# Patient Record
Sex: MALE | Race: WHITE | NOT HISPANIC OR LATINO | Employment: FULL TIME | URBAN - METROPOLITAN AREA
[De-identification: names, ages, dates, MRNs, and addresses within clinical notes are randomized per-mention and may not be internally consistent; named-entity substitution may affect disease eponyms.]

---

## 2017-04-21 ENCOUNTER — OFFICE VISIT (OUTPATIENT)
Dept: CARDIOLOGY | Facility: MEDICAL CENTER | Age: 47
End: 2017-04-21
Payer: COMMERCIAL

## 2017-04-21 VITALS
WEIGHT: 271 LBS | DIASTOLIC BLOOD PRESSURE: 76 MMHG | SYSTOLIC BLOOD PRESSURE: 138 MMHG | OXYGEN SATURATION: 94 % | BODY MASS INDEX: 36.7 KG/M2 | HEART RATE: 66 BPM | HEIGHT: 72 IN

## 2017-04-21 DIAGNOSIS — R07.82 INTERCOSTAL PAIN: ICD-10-CM

## 2017-04-21 DIAGNOSIS — R73.09 ELEVATED RANDOM BLOOD GLUCOSE LEVEL: ICD-10-CM

## 2017-04-21 DIAGNOSIS — R94.31 ABNORMAL EKG: ICD-10-CM

## 2017-04-21 DIAGNOSIS — R06.83 SNORING: ICD-10-CM

## 2017-04-21 DIAGNOSIS — E66.3 OVERWEIGHT: ICD-10-CM

## 2017-04-21 PROBLEM — R73.9 ELEVATED RANDOM BLOOD GLUCOSE LEVEL: Status: ACTIVE | Noted: 2017-04-21

## 2017-04-21 PROCEDURE — 99244 OFF/OP CNSLTJ NEW/EST MOD 40: CPT | Performed by: INTERNAL MEDICINE

## 2017-04-21 ASSESSMENT — ENCOUNTER SYMPTOMS
MEMORY LOSS: 0
CLAUDICATION: 0
NAUSEA: 0
DEPRESSION: 0
WEIGHT LOSS: 0
COUGH: 0
HEADACHES: 0
HEARTBURN: 0
PALPITATIONS: 0
PND: 0
NERVOUS/ANXIOUS: 0
DIZZINESS: 0
BACK PAIN: 0
FALLS: 0
EYES NEGATIVE: 1
INSOMNIA: 1
NECK PAIN: 0
BRUISES/BLEEDS EASILY: 0
SHORTNESS OF BREATH: 0
LOSS OF CONSCIOUSNESS: 0

## 2017-04-21 NOTE — Clinical Note
[facility]   [address]  [place] [address]  Phone: [phone]  Fax: [phone]              Jamar Marcano  1970    Encounter Date: 4/21/2017    Pam Anderson M.D.          PROGRESS NOTE:  Subjective:   Jamar Marcano is a 46 y.o. male who presents today as a new patient. He was sent by Dr. Ruiz in regards to his atypical chest pain    He works full time and is busy day on. He gets stressed at work and has a busy family as well. 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 He does not get chest pain at work, but has bothered him for several years    He saw my partner for this reason in 2012. He describes the pain is tactile over the left ribs, it happens when he rests or lay down. He admits to being overweight and is concerned about his blood glucose that his doctor is following. The pain lasts for minutes, it hasn't seemed to change but he really sometimes worries it's related to a heart or lung condition.    No changes, he does keep up at night with this. He does snore and he is getting workup for sleep apnea  No other associated symptoms such as breathlessness nervousness fainting or sweating    History reviewed. No pertinent past medical history.  Past Surgical History   Procedure Laterality Date   • Foot surgery       Family History   Problem Relation Age of Onset   • Heart Disease Neg Hx      History   Smoking status   • Never Smoker    Smokeless tobacco   • Never Used     No Known Allergies  Outpatient Encounter Prescriptions as of 4/21/2017   Medication Sig Dispense Refill   • Pseudoephedrine HCl (SUDAFED 12 HOUR PO) Take  by mouth.     • ibuprofen (MOTRIN) 200 MG TABS Take 200 mg by mouth every 6 hours as needed.     • Zqmiicquv-PLA-VG-APAP (WESTON-SELTZER PLUS COLD & FLU PO) Take  by mouth.     • azithromycin (ZITHROMAX) 250 MG TABS 2 tabs by mouth day 1, 1 tab by mouth days 2-5 6 Tab 0     No facility-administered  "encounter medications on file as of 4/21/2017.     Review of Systems   Constitutional: Positive for malaise/fatigue. Negative for weight loss.   HENT: Negative for hearing loss.    Eyes: Negative.    Respiratory: Negative for cough and shortness of breath.    Cardiovascular: Negative for palpitations, claudication, leg swelling and PND.   Gastrointestinal: Negative for heartburn and nausea.   Musculoskeletal: Negative for back pain, falls and neck pain.   Neurological: Negative for dizziness, loss of consciousness and headaches.   Endo/Heme/Allergies: Does not bruise/bleed easily.   Psychiatric/Behavioral: Negative for depression and memory loss. The patient has insomnia. The patient is not nervous/anxious.    All other systems reviewed and are negative.       Objective:   /76 mmHg  Pulse 66  Ht 1.829 m (6' 0.01\")  Wt 122.925 kg (271 lb)  BMI 36.75 kg/m2  SpO2 94%    Physical Exam   Constitutional: He is oriented to person, place, and time. He appears well-nourished.   Anxious but appropriate   Eyes: EOM are normal. Pupils are equal, round, and reactive to light. No scleral icterus.   Neck: No JVD present. No tracheal deviation present. No thyromegaly present.   Cardiovascular: Normal rate and regular rhythm.    Pulmonary/Chest: Breath sounds normal. No respiratory distress. He has no wheezes. He exhibits no tenderness.   Abdominal: Bowel sounds are normal.   Musculoskeletal: He exhibits no edema.   Neurological: He is alert and oriented to person, place, and time. He exhibits normal muscle tone.   Skin: Skin is warm and dry. No rash noted.   Psychiatric: He has a normal mood and affect. His behavior is normal.       Assessment:     1. Intercostal pain  Echocardiogram Comp w/o Cont    DX-CHEST-2 VIEWS   2. Abnormal EKG     3. Overweight     4. Snoring     5. Elevated random blood glucose level         Medical Decision Making:  Today's Assessment / Status / Plan:     We reviewed his hospital course which " goes back to several years. I looked at his EKG from his doctor's office was only says mild ST abnormalities in sinus rhythm    We talked about the differential diagnosis for chest pain including but not limited to costochondritis and anxiety, stressors underlying coronary disease or heart disease as well as pulmonary diseases. He has no high risk features of any of these in my opinion. He does not have tactile tenderness on his exam    I think it'll be reasonable to start with reassurance and mild noninvasive testing  Will order a chest x-ray as well as an echocardiogram to assess the heart and lung function and rule out high risk lesions or dysfunction    Of course if he has more symptoms or change, I would ask him to come back and we'll talk about doing a nuclear perfusion imaging study, stress test or further testing.    He voices understanding, I do not think he would need to take an aspirin at his young age with very atypical symptoms          Miller Buckner M.D.  [address]  [place] [address]  VIA Facsimile: [phone]

## 2017-04-21 NOTE — MR AVS SNAPSHOT
"        Jamar Marcano   2017 3:45 PM   Office Visit   MRN: [id number]    Department:  [facility]   Dept Phone:  [phone]    Description:  Male : 1970   Provider:  Pam Anderson M.D.           Reason for Visit     New Patient           Allergies as of 2017     No Known Allergies      You were diagnosed with     Intercostal pain   [261325]       Abnormal EKG   [146805]         Vital Signs     Blood Pressure Pulse Height Weight Body Mass Index Oxygen Saturation    138/76 mmHg 66 1.829 m (6' 0.01\") 122.925 kg (271 lb) 36.75 kg/m2 94%    Smoking Status                   Never Smoker            Basic Information     Date Of Birth Sex Race Ethnicity Preferred Language    1970 Male White Non- English      Problem List              ICD-10-CM Priority Class Noted - Resolved    Abnormal EKG R94.31 Medium  2012 - Present    Intercostal pain R07.82   2017 - Present      Health Maintenance        Date Due Completion Dates    IMM DTaP/Tdap/Td Vaccine (1 - Tdap) 10/8/1991 ---            Current Immunizations     No immunizations on file.      Below and/or attached are the medications your provider expects you to take. Review all of your home medications and newly ordered medications with your provider and/or pharmacist. Follow medication instructions as directed by your provider and/or pharmacist. Please keep your medication list with you and share with your provider. Update the information when medications are discontinued, doses are changed, or new medications (including over-the-counter products) are added; and carry medication information at all times in the event of emergency situations     Allergies:  No Known Allergies          Medications  Valid as of: 2017 -  4:18 PM    Generic Name Brand Name Tablet Size Instructions for use    Azithromycin (Tab) ZITHROMAX 250 MG 2 tabs by mouth day 1, 1 tab by mouth days 2-5        Ibuprofen (Tab) MOTRIN 200 MG Take 200 " mg by mouth every 6 hours as needed.        Nzfmoykva-OAP-JA-APAP   Take  by mouth.        Pseudoephedrine HCl   Take  by mouth.        .                 Medicines prescribed today were sent to:     [id number] - [address], [place] [address]    Phone: [phone] Fax: [phone]    Open 24 Hours?: No      Medication refill instructions:       If your prescription bottle indicates you have medication refills left, it is not necessary to call your provider’s office. Please contact your pharmacy and they will refill your medication.    If your prescription bottle indicates you do not have any refills left, you may request refills at any time through one of the following ways: The online [employer] system (except Urgent Care), by calling your provider’s office, or by asking your pharmacy to contact your provider’s office with a refill request. Medication refills are processed only during regular business hours and may not be available until the next business day. Your provider may request additional information or to have a follow-up visit with you prior to refilling your medication.   *Please Note: Medication refills are assigned a new Rx number when refilled electronically. Your pharmacy may indicate that no refills were authorized even though a new prescription for the same medication is available at the pharmacy. Please request the medicine by name with the pharmacy before contacting your provider for a refill.        Your To Do List     Future Labs/Procedures Complete By Expires    DX-CHEST-2 VIEWS  As directed 4/21/2018    Echocardiogram Comp w/o Cont  As directed 4/21/2018         [employer] Access Code: Activation code not generated  Current [employer] Status: Active

## 2017-05-03 ENCOUNTER — TELEPHONE (OUTPATIENT)
Dept: CARDIOLOGY | Facility: MEDICAL CENTER | Age: 47
End: 2017-05-03

## 2017-05-03 ENCOUNTER — HOSPITAL ENCOUNTER (OUTPATIENT)
Dept: RADIOLOGY | Facility: MEDICAL CENTER | Age: 47
End: 2017-05-03
Attending: INTERNAL MEDICINE
Payer: COMMERCIAL

## 2017-05-03 DIAGNOSIS — R07.82 INTERCOSTAL PAIN: ICD-10-CM

## 2017-05-03 PROCEDURE — 71020 DX-CHEST-2 VIEWS: CPT

## 2017-05-22 ENCOUNTER — HOSPITAL ENCOUNTER (OUTPATIENT)
Dept: CARDIOLOGY | Facility: MEDICAL CENTER | Age: 47
End: 2017-05-22
Attending: INTERNAL MEDICINE
Payer: COMMERCIAL

## 2017-05-22 DIAGNOSIS — R07.82 INTERCOSTAL PAIN: ICD-10-CM

## 2017-05-22 PROCEDURE — 93306 TTE W/DOPPLER COMPLETE: CPT | Mod: 26 | Performed by: INTERNAL MEDICINE

## 2017-05-22 PROCEDURE — 93306 TTE W/DOPPLER COMPLETE: CPT

## 2017-05-23 ENCOUNTER — TELEPHONE (OUTPATIENT)
Dept: CARDIOLOGY | Facility: MEDICAL CENTER | Age: 47
End: 2017-05-23

## 2017-05-23 LAB
LV EJECT FRACT  99904: 60
LV EJECT FRACT MOD 4C 99902: 58.93

## 2017-05-23 NOTE — TELEPHONE ENCOUNTER
----- Message from Pam Anderson M.D. sent at 5/23/2017 10:23 AM PDT -----  Normal echo   Good news

## 2018-05-29 ENCOUNTER — OFFICE VISIT (OUTPATIENT)
Dept: URGENT CARE | Facility: PHYSICIAN GROUP | Age: 48
End: 2018-05-29
Payer: COMMERCIAL

## 2018-05-29 VITALS
DIASTOLIC BLOOD PRESSURE: 78 MMHG | BODY MASS INDEX: 35.89 KG/M2 | WEIGHT: 265 LBS | HEART RATE: 84 BPM | TEMPERATURE: 98.7 F | SYSTOLIC BLOOD PRESSURE: 136 MMHG | HEIGHT: 72 IN | OXYGEN SATURATION: 97 % | RESPIRATION RATE: 18 BRPM

## 2018-05-29 DIAGNOSIS — R21 SKIN RASH: ICD-10-CM

## 2018-05-29 PROCEDURE — 99203 OFFICE O/P NEW LOW 30 MIN: CPT | Performed by: NURSE PRACTITIONER

## 2018-05-29 RX ORDER — CEPHALEXIN 500 MG/1
500 CAPSULE ORAL 3 TIMES DAILY
Qty: 21 CAP | Refills: 0 | Status: SHIPPED | OUTPATIENT
Start: 2018-05-29 | End: 2018-06-05

## 2018-05-29 ASSESSMENT — ENCOUNTER SYMPTOMS
FEVER: 0
CHILLS: 0

## 2018-05-29 NOTE — PROGRESS NOTES
"Subjective:      Jamar Marcano is a 47 y.o. male who presents with Rash (R lower leg, itchy X2 days )     History reviewed. No pertinent past medical history.  Social History     Social History   • Marital status:      Spouse name: N/A   • Number of children: N/A   • Years of education: N/A     Occupational History   • Not on file.     Social History Main Topics   • Smoking status: Never Smoker   • Smokeless tobacco: Never Used   • Alcohol use Not on file   • Drug use: Unknown   • Sexual activity: Not on file     Other Topics Concern   • Not on file     Social History Narrative   • No narrative on file     Family History   Problem Relation Age of Onset   • Heart Disease Neg Hx        Allergies: Patient has no known allergies.    Patient is a 47-year-old male who presents stating with complaint of some type of bite or rash to the medial aspect of the right lower extremity. Patient noticed the rash yesterday morning, states it is getting darker today. Patient states he did go camping over the holiday weekend with his family. No other family members have similar rashes.          Rash   This is a new problem. The current episode started in the past 7 days. The problem has been gradually worsening since onset. Location: right leg. The rash is characterized by itchiness and redness. It is unknown if there was an exposure to a precipitant. Pertinent negatives include no fever. Past treatments include nothing. The treatment provided no relief.       Review of Systems   Constitutional: Positive for malaise/fatigue. Negative for chills and fever.   Skin: Positive for rash.        Mild itching   All other systems reviewed and are negative.         Objective:     /78   Pulse 84   Temp 37.1 °C (98.7 °F)   Resp 18   Ht 1.829 m (6' 0.01\")   Wt 120.2 kg (265 lb)   SpO2 97%   BMI 35.93 kg/m²      Physical Exam   Constitutional: He is oriented to person, place, and time. He appears well-developed and " well-nourished.   Cardiovascular: Normal rate and regular rhythm.    Neurological: He is alert and oriented to person, place, and time.   Skin: Capillary refill takes less than 2 seconds. Rash noted.        Localized rash to the medial aspect of the right leg; non-rasied and has a vasculitic appearance. There is a dark non-ulcerated center. No skin breakdown, no erythema or drainage. Area is mildly tender to palpation over the rash only.    Psychiatric: He has a normal mood and affect. His behavior is normal. Judgment and thought content normal.   Vitals reviewed.              Assessment/Plan:     1. Skin rash; possible insect bite  -topical bactroban  -RX for keflex; start only for increasing redness, pain, or erythema  -return immediately also for any worsening of symptoms.

## 2018-11-18 ENCOUNTER — OFFICE VISIT (OUTPATIENT)
Dept: URGENT CARE | Facility: PHYSICIAN GROUP | Age: 48
End: 2018-11-18
Payer: COMMERCIAL

## 2018-11-18 VITALS
OXYGEN SATURATION: 95 % | DIASTOLIC BLOOD PRESSURE: 80 MMHG | HEART RATE: 80 BPM | TEMPERATURE: 98.2 F | BODY MASS INDEX: 35.89 KG/M2 | HEIGHT: 72 IN | RESPIRATION RATE: 16 BRPM | WEIGHT: 265 LBS | SYSTOLIC BLOOD PRESSURE: 130 MMHG

## 2018-11-18 DIAGNOSIS — R05.9 COUGH: ICD-10-CM

## 2018-11-18 PROCEDURE — 99214 OFFICE O/P EST MOD 30 MIN: CPT | Performed by: PHYSICIAN ASSISTANT

## 2018-11-18 RX ORDER — FLUTICASONE PROPIONATE 50 MCG
1 SPRAY, SUSPENSION (ML) NASAL 2 TIMES DAILY
Qty: 16 G | Refills: 0 | Status: SHIPPED | OUTPATIENT
Start: 2018-11-18 | End: 2021-07-30

## 2018-11-18 RX ORDER — AZITHROMYCIN 250 MG/1
TABLET, FILM COATED ORAL
Qty: 6 TAB | Refills: 0 | Status: SHIPPED | OUTPATIENT
Start: 2018-11-18 | End: 2021-07-30

## 2018-11-18 NOTE — PROGRESS NOTES
"Chief Complaint   Patient presents with   • Cough     congestion x 3 days        HISTORY OF PRESENT ILLNESS: Patient is a 48 y.o. male who presents today for 3 days of worsening sinus congestion and pressure with overall 10 days of sinus symptoms/pressure.   Coughing and sinus congestion and pressure is worse in the mornings and nights.  The cough has been keeping him awake, has been taking OTC with mild relief.    Cough is dry and \"hacking\" and not getting any better.    He is not having SOB or chest tightness.  No fevers or chills.  No pain with breathing.  States he is leaving for 1 week tomorrow.     Patient Active Problem List    Diagnosis Date Noted   • Intercostal pain 04/21/2017   • Overweight 04/21/2017   • Snoring 04/21/2017   • Elevated random blood glucose level 04/21/2017       Allergies:Patient has no known allergies.    Current Outpatient Prescriptions Ordered in [facility]   Medication Sig Dispense Refill   • Pseudoephedrine HCl (SUDAFED 12 HOUR PO) Take  by mouth.     • ibuprofen (MOTRIN) 200 MG TABS Take 200 mg by mouth every 6 hours as needed.     • Loylskezw-FUW-AA-APAP (WESTON-SELTZER PLUS COLD & FLU PO) Take  by mouth.     • azithromycin (ZITHROMAX) 250 MG TABS 2 tabs by mouth day 1, 1 tab by mouth days 2-5 6 Tab 0     No current Epic-ordered facility-administered medications on file.        History reviewed. No pertinent past medical history.    Social History   Substance Use Topics   • Smoking status: Never Smoker   • Smokeless tobacco: Never Used   • Alcohol use Not on file       No family status information on file.     Family History   Problem Relation Age of Onset   • Heart Disease Neg Hx        ROS:  Review of Systems   Constitutional: SEE HPI  HENT: SEE HPI  Eyes: Negative for blurred vision.   Respiratory: SEE HPI  Cardiovascular: Negative for chest pain, palpitations, orthopnea and leg swelling.   Gastrointestinal: Negative for heartburn, nausea, vomiting and abdominal pain.   Genitourinary: " Negative for dysuria, urgency and frequency.     Exam:  Blood pressure 130/80, pulse 80, temperature 36.8 °C (98.2 °F), temperature source Temporal, resp. rate 16, height 1.829 m (6'), weight 120.2 kg (265 lb), SpO2 95 %.  General:  Well nourished, well developed male in NAD  Eyes: PERRLA, EOM within normal limits, no conjunctival injection, no scleral icterus, visual fields and acuity grossly intact.  Ears: Normal shape and symmetry, no tenderness, no discharge. External canals are without any significant edema or erythema. Tympanic membranes are without any inflammation, no effusion   Nose: boggy turbinates, clear rhinorrhea.   Mouth: reasonable hygiene, no erythema exudates or tonsillar enlargement.  Neck: no masses, range of motion within normal limits, no tracheal deviation. No lymphadenopathy  Pulmonary: Normal respiratory effort, no wheezes, crackles, or rhonchi.  Cardiovascular: regular rate and rhythm without murmurs, rubs, or gallops.  Abdomen: Soft, nontender, nondistended. Normal bowel sounds. No hepatosplenomegaly or masses, or hernias. No rebound or guarding.  Skin: No visible rashes or lesion. Warm, pink, dry.   Extremities: no clubbing, cyanosis, or edema.  Neuro: A&O x 3. Speech normal/clear.  Normal gait.       Assessment/Plan:  1. Cough  fluticasone (FLONASE ALLERGY RELIEF) 50 MCG/ACT nasal spray    azithromycin (ZITHROMAX) 250 MG Tab         -steamy showers/humidifier.  -Flonase, saline, sinus care discussed in detail.  Suspect large degree of cough is PND/pharygeal irritation  -declines cough rx for bedtime  -contingent antibiotic prescription given to patient to fill upon meeting criteria of guidelines discussed.         Supportive care, differential diagnoses, and indications for immediate follow-up discussed with patient.   Pathogenesis of diagnosis discussed including typical length and natural progression.   Instructed to return to clinic or nearest emergency department for any change in  condition, further concerns, or worsening of symptoms.  Patient states understanding of the plan of care and discharge instructions.      Tiff Spann P.A.-C.

## 2019-07-09 ENCOUNTER — OFFICE VISIT (OUTPATIENT)
Dept: URGENT CARE | Facility: PHYSICIAN GROUP | Age: 49
End: 2019-07-09
Payer: COMMERCIAL

## 2019-07-09 VITALS
TEMPERATURE: 98 F | RESPIRATION RATE: 18 BRPM | HEART RATE: 82 BPM | DIASTOLIC BLOOD PRESSURE: 80 MMHG | HEIGHT: 72 IN | OXYGEN SATURATION: 94 % | WEIGHT: 265 LBS | SYSTOLIC BLOOD PRESSURE: 118 MMHG | BODY MASS INDEX: 35.89 KG/M2
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
## 2019-07-09 PROCEDURE — 99214 OFFICE O/P EST MOD 30 MIN: CPT | Performed by: PHYSICIAN ASSISTANT

## 2019-07-09 RX ORDER — CEPHALEXIN 500 MG/1
500 CAPSULE ORAL 4 TIMES DAILY
Qty: 28 CAP | Refills: 0 | Status: SHIPPED | OUTPATIENT
Start: 2019-07-09 | End: 2019-07-16

## 2019-07-09 RX ORDER — MUPIROCIN CALCIUM 20 MG/G
CREAM TOPICAL
Qty: 1 TUBE | Refills: 0 | Status: SHIPPED | OUTPATIENT
Start: 2019-07-09

## 2019-07-09 ASSESSMENT — ENCOUNTER SYMPTOMS
COUGH: 0
SPUTUM PRODUCTION: 0
MYALGIAS: 0
SORE THROAT: 0
ABDOMINAL PAIN: 0
CHILLS: 0
FEVER: 0
VOMITING: 0
WHEEZING: 0
DIARRHEA: 0
SHORTNESS OF BREATH: 0
NAUSEA: 0

## 2019-07-09 NOTE — PROGRESS NOTES
Subjective:   Jamar Marcano is a 48 y.o. male who presents for Insect Bite (right leg, inect bite, happened last week. )        Other   This is a new problem. The current episode started in the past 7 days. Pertinent negatives include no abdominal pain, chills, congestion, coughing, fever, myalgias, nausea, rash, sore throat or vomiting.     Patient comes to clinic about 1 week status post insect bites to right leg.  Notes he has 3 distinct insect bites to right leg.  He did not witness insects biting him.  Denies fevers chills.  Denies discharge or drainage.  Notes small area of redness extending out from all 3 of the bites.  Denies swelling the leg.  Patient states he was in the [place] area when insect bites occurred.  He notes similar episode last May while traveling to the same area.  At that time he did come to the clinic for antibiotics to help resolve redness extending out from presumed insect bites.  Request the same today.    Review of Systems   Constitutional: Negative for chills and fever.   HENT: Negative for congestion, ear pain and sore throat.    Respiratory: Negative for cough, sputum production, shortness of breath and wheezing.    Gastrointestinal: Negative for abdominal pain, diarrhea, nausea and vomiting.   Musculoskeletal: Negative for myalgias.   Skin: Negative for rash.     Allergies   Allergen Reactions   • Ibuprofen       Objective:   /80 (BP Location: Left arm, Patient Position: Sitting, BP Cuff Size: Large adult)   Pulse 82   Temp 36.7 °C (98 °F) (Temporal)   Resp 18   Ht 1.829 m (6')   Wt 120.2 kg (265 lb)   SpO2 94%   BMI 35.94 kg/m²   Physical Exam   Constitutional: He is oriented to person, place, and time. He appears well-developed and well-nourished. No distress.   HENT:   Head: Normocephalic and atraumatic.   Right Ear: External ear normal.   Left Ear: External ear normal.   Nose: Nose normal.   Eyes: Conjunctivae are normal. Right eye exhibits no discharge. Left  eye exhibits no discharge. No scleral icterus.   Neck: Neck supple.   Pulmonary/Chest: Effort normal. No respiratory distress.   Musculoskeletal: Normal range of motion.   Area of right calf with 3 distinct erythematous presumed insect bites, mild extension of incomplete erythema extending out from each of the 3 distinct lesions, no calf edema, diffuse erythema, nonvesicular, nonulcerative, nonpustular in appearance   Neurological: He is alert and oriented to person, place, and time. Coordination normal.   Skin: Skin is warm and dry. He is not diaphoretic. No pallor.   Psychiatric: He has a normal mood and affect.   Nursing note and vitals reviewed.        Assessment/Plan:   1. Insect bite, initial encounter  - mupirocin calcium (BACTROBAN) 2 % Cream; Apply to affected area twice daily  Dispense: 1 Tube; Refill: 0  - cephALEXin (KEFLEX) 500 MG Cap; Take 1 Cap by mouth 4 times a day for 7 days.  Dispense: 28 Cap; Refill: 0  Supportive care is reviewed with patient/caregiver - recommend to push PO fluids and electrolytes, sent with topical antibiotic ointment, little concern for cellulitic infection more appearance of inflammatory extension, sent with contingent antibiotic per patient request    Contingent antibiotic prescription given to patient to fill upon meeting criteria of guidelines discussed.   If filling,  take full course of Rx, take with probiotics, observe for resolution  Return to clinic with lack of resolution or progression of symptoms.    Differential diagnosis, natural history, supportive care, and indications for immediate follow-up discussed.

## 2021-07-30 ENCOUNTER — OFFICE VISIT (OUTPATIENT)
Dept: URGENT CARE | Facility: PHYSICIAN GROUP | Age: 51
End: 2021-07-30

## 2021-07-30 VITALS
BODY MASS INDEX: 40.32 KG/M2 | HEIGHT: 71 IN | DIASTOLIC BLOOD PRESSURE: 82 MMHG | OXYGEN SATURATION: 93 % | WEIGHT: 288 LBS | SYSTOLIC BLOOD PRESSURE: 120 MMHG | RESPIRATION RATE: 18 BRPM | TEMPERATURE: 97.7 F | HEART RATE: 80 BPM

## 2021-07-30 DIAGNOSIS — Z02.4 ENCOUNTER FOR DEPARTMENT OF TRANSPORTATION (DOT) EXAMINATION FOR DRIVING LICENSE RENEWAL: ICD-10-CM

## 2021-07-30 PROCEDURE — 7100 PR DOT PHYSICAL: Performed by: PHYSICIAN ASSISTANT

## 2021-07-30 RX ORDER — COVID-19 MOLECULAR TEST ASSAY
KIT MISCELLANEOUS
COMMUNITY
Start: 2021-06-16 | End: 2021-07-30

## 2023-07-10 ENCOUNTER — OFFICE VISIT (OUTPATIENT)
Dept: URGENT CARE | Facility: PHYSICIAN GROUP | Age: 53
End: 2023-07-10

## 2023-07-10 VITALS
RESPIRATION RATE: 16 BRPM | BODY MASS INDEX: 42.64 KG/M2 | WEIGHT: 304.6 LBS | HEART RATE: 65 BPM | TEMPERATURE: 98.2 F | SYSTOLIC BLOOD PRESSURE: 134 MMHG | OXYGEN SATURATION: 95 % | HEIGHT: 71 IN | DIASTOLIC BLOOD PRESSURE: 80 MMHG

## 2023-07-10 DIAGNOSIS — Z02.4 ENCOUNTER FOR DEPARTMENT OF TRANSPORTATION (DOT) EXAMINATION FOR DRIVING LICENSE RENEWAL: ICD-10-CM

## 2023-07-10 PROCEDURE — 7100 PR DOT PHYSICAL: Performed by: STUDENT IN AN ORGANIZED HEALTH CARE EDUCATION/TRAINING PROGRAM

## 2023-07-10 PROCEDURE — 3075F SYST BP GE 130 - 139MM HG: CPT | Performed by: STUDENT IN AN ORGANIZED HEALTH CARE EDUCATION/TRAINING PROGRAM

## 2023-07-10 PROCEDURE — 3079F DIAST BP 80-89 MM HG: CPT | Performed by: STUDENT IN AN ORGANIZED HEALTH CARE EDUCATION/TRAINING PROGRAM

## 2023-07-10 RX ORDER — LISINOPRIL 10 MG/1
TABLET ORAL
COMMUNITY
Start: 2023-07-02

## 2023-07-10 RX ORDER — LISINOPRIL 10 MG/1
TABLET ORAL
COMMUNITY

## 2023-07-10 NOTE — PROGRESS NOTES
"Subjective:   CHIEF COMPLAINT  Chief Complaint   Patient presents with    Employment Physical     DOT Physical        HPI  Jamar Marcano is a 52 y.o. male who presents for DOT examination.      REVIEW OF SYSTEMS  General: no fever or chills  GI: no nausea or vomiting  See HPI for further details.    PAST MEDICAL HISTORY  Patient Active Problem List    Diagnosis Date Noted    Intercostal pain 04/21/2017    Overweight 04/21/2017    Snoring 04/21/2017    Elevated random blood glucose level 04/21/2017       SURGICAL HISTORY   has a past surgical history that includes foot surgery.    ALLERGIES  Allergies   Allergen Reactions    Ibuprofen Unspecified       CURRENT MEDICATIONS  Home Medications       Reviewed by Johan Casiano D.O. (Physician) on 07/10/23 at 1532  Med List Status: <None>     Medication Last Dose Status   lisinopril (PRINIVIL) 10 MG Tab Taking Active   lisinopril (PRINIVIL) 10 MG Tab Taking Active   mupirocin calcium (BACTROBAN) 2 % Cream Taking Flagged for Removal   Pseudoephedrine HCl (SUDAFED 12 HOUR PO) PRN Active                    SOCIAL HISTORY  Social History     Tobacco Use    Smoking status: Never    Smokeless tobacco: Never   Substance and Sexual Activity    Alcohol use: Not Currently    Drug use: Not on file    Sexual activity: Not on file       FAMILY HISTORY  Family History   Problem Relation Age of Onset    Heart Disease Neg Hx           Objective:   PHYSICAL EXAM  VITAL SIGNS: /80 (BP Location: Right arm, Patient Position: Sitting, BP Cuff Size: Adult)   Pulse 65   Temp 36.8 °C (98.2 °F) (Temporal)   Resp 16   Ht 1.803 m (5' 11\")   Wt (!) 138 kg (304 lb 9.6 oz)   SpO2 95%   BMI 42.48 kg/m²     Gen: no acute distress, normal voice  Skin: dry, intact, moist mucosal membranes  ENT: TMs clear intact bilaterally without erythema, bulging or effusion. No pharyngeal erythema or exudates. Uvula midline.  Eye: EOMI, PERRLA  Lungs: CTAB w/ symmetric expansion  CV: RRR w/o murmurs " or clicks  Abdomen: Soft, no TTP, rebound or guarding  MSK: No gross abnormalities. Full range of motion.  Able to stand on toes and heels.  Psych: normal affect, normal judgement, alert, awake    Assessment/Plan:     1. Encounter for Department of Transportation (DOT) examination for driving license renewal        Past medical history of hypertension that he manages with lisinopril.  He is not on any additional medications.  Blood pressure currently well controlled.   reviewed which was negative.    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  This does not affect his ability to drive or use a clutch.    Return for recertification in 1 year due to history of hypertension.    See scanned in documentation for full details.    Differential diagnosis, natural history, supportive care, and indications for immediate follow-up discussed. All questions answered. Patient agrees with the plan of care.    Follow-up as needed if symptoms worsen or fail to improve to PCP, Urgent care or Emergency Room.    Please note that this dictation was created using voice recognition software. I have made a reasonable attempt to correct obvious errors, but I expect that there are errors of grammar and possibly content that I did not discover before finalizing the note.

## 2025-01-29 ENCOUNTER — APPOINTMENT (OUTPATIENT)
Dept: URGENT CARE | Facility: PHYSICIAN GROUP | Age: 55
End: 2025-01-29
Payer: COMMERCIAL